# Patient Record
Sex: MALE | Race: WHITE | NOT HISPANIC OR LATINO | Employment: PART TIME | URBAN - METROPOLITAN AREA
[De-identification: names, ages, dates, MRNs, and addresses within clinical notes are randomized per-mention and may not be internally consistent; named-entity substitution may affect disease eponyms.]

---

## 2024-06-16 ENCOUNTER — HOSPITAL ENCOUNTER (EMERGENCY)
Facility: HOSPITAL | Age: 74
Discharge: HOME | End: 2024-06-16
Attending: EMERGENCY MEDICINE
Payer: MEDICARE

## 2024-06-16 VITALS
TEMPERATURE: 98.4 F | RESPIRATION RATE: 18 BRPM | OXYGEN SATURATION: 97 % | DIASTOLIC BLOOD PRESSURE: 116 MMHG | HEART RATE: 68 BPM | WEIGHT: 185 LBS | HEIGHT: 66 IN | BODY MASS INDEX: 29.73 KG/M2 | SYSTOLIC BLOOD PRESSURE: 188 MMHG

## 2024-06-16 DIAGNOSIS — I15.9 SECONDARY HYPERTENSION: Primary | ICD-10-CM

## 2024-06-16 LAB
ALBUMIN SERPL BCP-MCNC: 4.2 G/DL (ref 3.4–5)
ALP SERPL-CCNC: 63 U/L (ref 33–136)
ALT SERPL W P-5'-P-CCNC: 19 U/L (ref 10–52)
ANION GAP SERPL CALC-SCNC: 13 MMOL/L (ref 10–20)
AST SERPL W P-5'-P-CCNC: 17 U/L (ref 9–39)
BASOPHILS # BLD AUTO: 0.04 X10*3/UL (ref 0–0.1)
BASOPHILS NFR BLD AUTO: 0.6 %
BILIRUB SERPL-MCNC: 0.3 MG/DL (ref 0–1.2)
BUN SERPL-MCNC: 16 MG/DL (ref 6–23)
CALCIUM SERPL-MCNC: 9.7 MG/DL (ref 8.6–10.3)
CHLORIDE SERPL-SCNC: 102 MMOL/L (ref 98–107)
CO2 SERPL-SCNC: 30 MMOL/L (ref 21–32)
CREAT SERPL-MCNC: 0.8 MG/DL (ref 0.5–1.3)
EGFRCR SERPLBLD CKD-EPI 2021: >90 ML/MIN/1.73M*2
EOSINOPHIL # BLD AUTO: 0.08 X10*3/UL (ref 0–0.4)
EOSINOPHIL NFR BLD AUTO: 1.2 %
ERYTHROCYTE [DISTWIDTH] IN BLOOD BY AUTOMATED COUNT: 12.3 % (ref 11.5–14.5)
GLUCOSE SERPL-MCNC: 111 MG/DL (ref 74–99)
HCT VFR BLD AUTO: 39.7 % (ref 41–52)
HGB BLD-MCNC: 13.2 G/DL (ref 13.5–17.5)
IMM GRANULOCYTES # BLD AUTO: 0.03 X10*3/UL (ref 0–0.5)
IMM GRANULOCYTES NFR BLD AUTO: 0.5 % (ref 0–0.9)
LYMPHOCYTES # BLD AUTO: 1.26 X10*3/UL (ref 0.8–3)
LYMPHOCYTES NFR BLD AUTO: 19.1 %
MCH RBC QN AUTO: 29.7 PG (ref 26–34)
MCHC RBC AUTO-ENTMCNC: 33.2 G/DL (ref 32–36)
MCV RBC AUTO: 89 FL (ref 80–100)
MONOCYTES # BLD AUTO: 0.63 X10*3/UL (ref 0.05–0.8)
MONOCYTES NFR BLD AUTO: 9.6 %
NEUTROPHILS # BLD AUTO: 4.55 X10*3/UL (ref 1.6–5.5)
NEUTROPHILS NFR BLD AUTO: 69 %
NRBC BLD-RTO: 0 /100 WBCS (ref 0–0)
PLATELET # BLD AUTO: 177 X10*3/UL (ref 150–450)
POTASSIUM SERPL-SCNC: 4.1 MMOL/L (ref 3.5–5.3)
PROT SERPL-MCNC: 6.9 G/DL (ref 6.4–8.2)
RBC # BLD AUTO: 4.45 X10*6/UL (ref 4.5–5.9)
SODIUM SERPL-SCNC: 141 MMOL/L (ref 136–145)
WBC # BLD AUTO: 6.6 X10*3/UL (ref 4.4–11.3)

## 2024-06-16 PROCEDURE — 2500000001 HC RX 250 WO HCPCS SELF ADMINISTERED DRUGS (ALT 637 FOR MEDICARE OP): Performed by: EMERGENCY MEDICINE

## 2024-06-16 PROCEDURE — 99283 EMERGENCY DEPT VISIT LOW MDM: CPT

## 2024-06-16 PROCEDURE — 36415 COLL VENOUS BLD VENIPUNCTURE: CPT | Performed by: EMERGENCY MEDICINE

## 2024-06-16 PROCEDURE — 80053 COMPREHEN METABOLIC PANEL: CPT | Performed by: EMERGENCY MEDICINE

## 2024-06-16 PROCEDURE — 85025 COMPLETE CBC W/AUTO DIFF WBC: CPT | Performed by: EMERGENCY MEDICINE

## 2024-06-16 RX ORDER — AMLODIPINE BESYLATE 5 MG/1
5 TABLET ORAL ONCE
Status: COMPLETED | OUTPATIENT
Start: 2024-06-16 | End: 2024-06-16

## 2024-06-16 RX ORDER — AMLODIPINE BESYLATE 5 MG/1
5 TABLET ORAL DAILY
Qty: 30 TABLET | Refills: 0 | Status: SHIPPED | OUTPATIENT
Start: 2024-06-16 | End: 2024-07-16

## 2024-06-16 ASSESSMENT — COLUMBIA-SUICIDE SEVERITY RATING SCALE - C-SSRS
6. HAVE YOU EVER DONE ANYTHING, STARTED TO DO ANYTHING, OR PREPARED TO DO ANYTHING TO END YOUR LIFE?: NO
2. HAVE YOU ACTUALLY HAD ANY THOUGHTS OF KILLING YOURSELF?: NO
1. IN THE PAST MONTH, HAVE YOU WISHED YOU WERE DEAD OR WISHED YOU COULD GO TO SLEEP AND NOT WAKE UP?: NO

## 2024-06-16 ASSESSMENT — LIFESTYLE VARIABLES
HAVE PEOPLE ANNOYED YOU BY CRITICIZING YOUR DRINKING: NO
TOTAL SCORE: 0
EVER HAD A DRINK FIRST THING IN THE MORNING TO STEADY YOUR NERVES TO GET RID OF A HANGOVER: NO
HAVE YOU EVER FELT YOU SHOULD CUT DOWN ON YOUR DRINKING: NO
EVER FELT BAD OR GUILTY ABOUT YOUR DRINKING: NO

## 2024-06-16 ASSESSMENT — PAIN - FUNCTIONAL ASSESSMENT: PAIN_FUNCTIONAL_ASSESSMENT: 0-10

## 2024-06-16 ASSESSMENT — PAIN SCALES - GENERAL
PAINLEVEL_OUTOF10: 0 - NO PAIN
PAINLEVEL_OUTOF10: 0 - NO PAIN

## 2024-06-16 NOTE — ED TRIAGE NOTES
Pt was at [employer] today and they tried to take his blood pressure and the machine was unable to read it.Pt is not on B/P meds. No c/o headache, chest pain, dizziness or SOB.

## 2024-06-16 NOTE — ED PROVIDER NOTES
HPI   Chief Complaint   Patient presents with    Hypertension      Pt was at [employer] today and they tried to take his blood pressure and the machine was unable to read it.Pt is not on B/P meds. No c/o headache, chest pain, dizziness or SOB.       73-year-old male presents to the ED for elevated blood pressure.  He has blood pressure taken by somebody at [employer] was very high.  He has not seen a physician for a decade.  Does not take any medications.  He has been trying to control his blood pressure with diet changes.  He denies headache.  No vision changes.  No chest pain.  No numbness or tingling of extremities.                          Britni Coma Scale Score: 15                     Patient History   History reviewed. No pertinent past medical history.  History reviewed. No pertinent surgical history.  No family history on file.  Social History     Tobacco Use    Smoking status: Former     Types: Cigarettes    Smokeless tobacco: Never   Vaping Use    Vaping status: Never Used   Substance Use Topics    Alcohol use: Never    Drug use: Never       Physical Exam   ED Triage Vitals [06/16/24 1330]   Temperature Heart Rate Respirations BP   36.9 °C (98.4 °F) 67 18 (!) 233/102      Pulse Ox Temp Source Heart Rate Source Patient Position   97 % Temporal -- --      BP Location FiO2 (%)     -- --       Physical Exam  Vitals and nursing note reviewed.   Constitutional:       Appearance: Normal appearance.   HENT:      Head: Normocephalic and atraumatic.   Eyes:      Extraocular Movements: Extraocular movements intact.      Conjunctiva/sclera: Conjunctivae normal.      Pupils: Pupils are equal, round, and reactive to light.   Cardiovascular:      Rate and Rhythm: Normal rate and regular rhythm.   Pulmonary:      Effort: Pulmonary effort is normal.      Breath sounds: Normal breath sounds.   Abdominal:      General: Abdomen is flat.      Palpations: Abdomen is soft.   Musculoskeletal:         General: Normal range of motion.       Cervical back: Normal range of motion and neck supple.   Skin:     General: Skin is warm and dry.   Neurological:      General: No focal deficit present.      Mental Status: He is alert and oriented to person, place, and time.   Psychiatric:         Mood and Affect: Mood normal.         Behavior: Behavior normal.         Thought Content: Thought content normal.         Judgment: Judgment normal.         ED Course & MDM   Diagnoses as of 06/18/24 6665   Secondary hypertension       Medical Decision Making  73-year-old male presents ED for elevated blood pressure.  He is asymptomatic.  It improved spontaneously in the ED.  Obtain screening laboratory work which showed normal kidney function.  I have started him on amlodipine daily.  I have given him a follow-up number to call to be seen by PCP.  All questions were answered.        Procedure  Procedures     Terence Chapman MD  06/18/24 7344

## 2024-09-01 ENCOUNTER — APPOINTMENT (OUTPATIENT)
Dept: CARDIOLOGY | Facility: HOSPITAL | Age: 74
End: 2024-09-01
Payer: MEDICARE

## 2024-09-01 ENCOUNTER — HOSPITAL ENCOUNTER (EMERGENCY)
Facility: HOSPITAL | Age: 74
Discharge: HOME | End: 2024-09-01
Attending: STUDENT IN AN ORGANIZED HEALTH CARE EDUCATION/TRAINING PROGRAM
Payer: MEDICARE

## 2024-09-01 VITALS
TEMPERATURE: 97.5 F | BODY MASS INDEX: 28.79 KG/M2 | RESPIRATION RATE: 18 BRPM | SYSTOLIC BLOOD PRESSURE: 153 MMHG | HEART RATE: 68 BPM | OXYGEN SATURATION: 97 % | DIASTOLIC BLOOD PRESSURE: 75 MMHG | HEIGHT: 68 IN | WEIGHT: 190 LBS

## 2024-09-01 DIAGNOSIS — I10 PRIMARY HYPERTENSION: Primary | ICD-10-CM

## 2024-09-01 DIAGNOSIS — I15.9 SECONDARY HYPERTENSION: ICD-10-CM

## 2024-09-01 LAB
ALBUMIN SERPL BCP-MCNC: 4.3 G/DL (ref 3.4–5)
ALP SERPL-CCNC: 70 U/L (ref 33–136)
ALT SERPL W P-5'-P-CCNC: 21 U/L (ref 10–52)
ANION GAP SERPL CALC-SCNC: 15 MMOL/L (ref 10–20)
AST SERPL W P-5'-P-CCNC: 20 U/L (ref 9–39)
BASOPHILS # BLD AUTO: 0.03 X10*3/UL (ref 0–0.1)
BASOPHILS NFR BLD AUTO: 0.3 %
BILIRUB SERPL-MCNC: 0.6 MG/DL (ref 0–1.2)
BUN SERPL-MCNC: 13 MG/DL (ref 6–23)
CALCIUM SERPL-MCNC: 9.2 MG/DL (ref 8.6–10.3)
CARDIAC TROPONIN I PNL SERPL HS: 10 NG/L (ref 0–20)
CARDIAC TROPONIN I PNL SERPL HS: 15 NG/L (ref 0–20)
CHLORIDE SERPL-SCNC: 101 MMOL/L (ref 98–107)
CO2 SERPL-SCNC: 29 MMOL/L (ref 21–32)
CREAT SERPL-MCNC: 0.89 MG/DL (ref 0.5–1.3)
EGFRCR SERPLBLD CKD-EPI 2021: >90 ML/MIN/1.73M*2
EOSINOPHIL # BLD AUTO: 0.01 X10*3/UL (ref 0–0.4)
EOSINOPHIL NFR BLD AUTO: 0.1 %
ERYTHROCYTE [DISTWIDTH] IN BLOOD BY AUTOMATED COUNT: 12.3 % (ref 11.5–14.5)
GLUCOSE SERPL-MCNC: 109 MG/DL (ref 74–99)
HCT VFR BLD AUTO: 40.8 % (ref 41–52)
HGB BLD-MCNC: 14 G/DL (ref 13.5–17.5)
IMM GRANULOCYTES # BLD AUTO: 0.04 X10*3/UL (ref 0–0.5)
IMM GRANULOCYTES NFR BLD AUTO: 0.4 % (ref 0–0.9)
LYMPHOCYTES # BLD AUTO: 0.77 X10*3/UL (ref 0.8–3)
LYMPHOCYTES NFR BLD AUTO: 8.4 %
MAGNESIUM SERPL-MCNC: 2.15 MG/DL (ref 1.6–2.4)
MCH RBC QN AUTO: 30.2 PG (ref 26–34)
MCHC RBC AUTO-ENTMCNC: 34.3 G/DL (ref 32–36)
MCV RBC AUTO: 88 FL (ref 80–100)
MONOCYTES # BLD AUTO: 0.96 X10*3/UL (ref 0.05–0.8)
MONOCYTES NFR BLD AUTO: 10.4 %
NEUTROPHILS # BLD AUTO: 7.39 X10*3/UL (ref 1.6–5.5)
NEUTROPHILS NFR BLD AUTO: 80.4 %
NRBC BLD-RTO: 0 /100 WBCS (ref 0–0)
PLATELET # BLD AUTO: 168 X10*3/UL (ref 150–450)
POTASSIUM SERPL-SCNC: 3.5 MMOL/L (ref 3.5–5.3)
PROT SERPL-MCNC: 7 G/DL (ref 6.4–8.2)
RBC # BLD AUTO: 4.63 X10*6/UL (ref 4.5–5.9)
SODIUM SERPL-SCNC: 141 MMOL/L (ref 136–145)
WBC # BLD AUTO: 9.2 X10*3/UL (ref 4.4–11.3)

## 2024-09-01 PROCEDURE — 99283 EMERGENCY DEPT VISIT LOW MDM: CPT

## 2024-09-01 PROCEDURE — 93005 ELECTROCARDIOGRAM TRACING: CPT

## 2024-09-01 PROCEDURE — 83735 ASSAY OF MAGNESIUM: CPT

## 2024-09-01 PROCEDURE — 2500000001 HC RX 250 WO HCPCS SELF ADMINISTERED DRUGS (ALT 637 FOR MEDICARE OP)

## 2024-09-01 PROCEDURE — 84484 ASSAY OF TROPONIN QUANT: CPT

## 2024-09-01 PROCEDURE — 36415 COLL VENOUS BLD VENIPUNCTURE: CPT

## 2024-09-01 PROCEDURE — 85025 COMPLETE CBC W/AUTO DIFF WBC: CPT

## 2024-09-01 PROCEDURE — 80053 COMPREHEN METABOLIC PANEL: CPT

## 2024-09-01 RX ORDER — HYDRALAZINE HYDROCHLORIDE 20 MG/ML
5 INJECTION INTRAMUSCULAR; INTRAVENOUS ONCE
Status: DISCONTINUED | OUTPATIENT
Start: 2024-09-01 | End: 2024-09-01

## 2024-09-01 RX ORDER — AMLODIPINE BESYLATE 5 MG/1
5 TABLET ORAL DAILY
Qty: 30 TABLET | Refills: 0 | Status: SHIPPED | OUTPATIENT
Start: 2024-09-01 | End: 2024-10-01

## 2024-09-01 RX ORDER — LABETALOL 100 MG/1
200 TABLET, FILM COATED ORAL ONCE
Status: COMPLETED | OUTPATIENT
Start: 2024-09-01 | End: 2024-09-01

## 2024-09-01 ASSESSMENT — LIFESTYLE VARIABLES
EVER HAD A DRINK FIRST THING IN THE MORNING TO STEADY YOUR NERVES TO GET RID OF A HANGOVER: NO
TOTAL SCORE: 0
EVER FELT BAD OR GUILTY ABOUT YOUR DRINKING: NO
HAVE PEOPLE ANNOYED YOU BY CRITICIZING YOUR DRINKING: NO
HAVE YOU EVER FELT YOU SHOULD CUT DOWN ON YOUR DRINKING: NO

## 2024-09-01 ASSESSMENT — PAIN - FUNCTIONAL ASSESSMENT: PAIN_FUNCTIONAL_ASSESSMENT: 0-10

## 2024-09-01 ASSESSMENT — PAIN SCALES - GENERAL: PAINLEVEL_OUTOF10: 0 - NO PAIN

## 2024-09-01 NOTE — ED PROVIDER NOTES
History of Present Illness     History provided by: Patient  Limitations to History: None  External Records Reviewed with Brief Summary:  Prior ED note from      HPI:  Adrián Resendiz is a 73 y.o. male who presents for evaluation of hypertension with mildly.  Patient states that he has been checking his blood pressures at home and they have been elevated in the 200s over 100s.  Previously been on amlodipine which had given good blood pressure control.  He does not currently have a PCP and with him having headache and elevated blood pressure recommend for evaluation today.  He is not having any chest pain shortness of breath swelling abdominal pain numbness tingling weakness.  No falls or syncope's.  He has no other known past medical history.    Physical Exam   Triage vitals:  T 36.4 °C (97.5 °F)  HR 95  BP (!) 213/115  RR 16  O2 96 % None (Room air)    General: Awake, alert, in no acute distress  Eyes: Gaze conjugate.  No scleral icterus or injection  HENT: Normo-cephalic, atraumatic. No stridor  CV: Regular rate, regular rhythm. Radial pulses 2+ bilaterally  Resp: Breathing non-labored, speaking in full sentences.  Clear to auscultation bilaterally  GI: Soft, non-distended, non-tender. No rebound or guarding.  : Deferred  MSK/Extremities: No gross bony deformities. Moving all extremities  Skin: Warm. Appropriate color  Neuro: Alert. Oriented. Face symmetric. Speech is fluent.  Gross strength and sensation intact in b/l UE and LEs  Psych: Appropriate mood and affect      Medical Decision Making & ED Course   Medical Decision Makin y.o. male presents for symptomatic hyper tension with blood pressure in the 200s over 100s.  He has mild headache that improved with labetalol blood pressure control.  On review blood pressure is 133/69 and upon discharge 153/75.  Patient had previously been well-controlled on amlodipine and will give prescription for 1 month and PCP referral.  Patient did have a left  fascicular block on initial EKG that had resolved on repeat with blood pressure control.  He has downtrending troponins and otherwise unremarkable lab work.  Patient to be referred to primary care and was given strict return precautions for chest pain headache vision changes and syncope.  Patient is agreeable and all questions answered.  ----      Social Determinants of Health which Significantly Impact Care: Difficulty obtaining outpatient follow-up The following actions were taken to address these social determinants: Patient given list of PCPs    EKG Independent Interpretation: EKG interpreted by myself. Please see ED Course and MDM for full interpretation.    Independent Result Review and Interpretation: Results were independently reviewed and interpreted by myself. Please see ED course and MDM for full interpretation.    Chronic conditions affecting the patient's care: As documented in the MDM    The patient was discussed with the following consultants/services: None    Care Considerations: As per [facility]    ED Course:  ED Course as of 09/01/24 2211   Sun Sep 01, 2024   1750 EKG from 1522 with sinus rhythm heart rate of 82 bpm VA interval 144 MS  MS QTc 436 MS within normal limits patient has a bifascicular block with no previous EKG for comparison.  Will obtain delta troponin and EKG [SC]   1751 ECG 12 lead  EKG from 1727 with sinus rhythm heart rate of 67 VA interval 136 MS QRS 96 MS QTc 403 MS within normal limits no acute ST segment elevations, bifascicular block previously seen now resolved the patient does have T wave inversions in lateral leads.  Patient remains asymptomatic with no chest pain. [SC]   1751 CBC and Auto Differential(!)  No leukocytosis anemia thrombocytopenia [SC]   1751 Comprehensive Metabolic Panel(!)  No renal hepatic or electrolyte abnormalities [SC]   1751 Troponin I Series, High Sensitivity (0, 1 HR)  Initial troponin of 15 with delta troponin of 10 [SC]      ED Course User  Index  [SC] Judi Dillard DO         Diagnoses as of 09/01/24 2211   Primary hypertension     Disposition   As a result of the work-up, the patient was discharged home.  he was informed of his diagnosis and instructed to come back with any concerns or worsening of condition.  he and was agreeable to the plan as discussed above.  he was given the opportunity to ask questions.  All of the patient's questions were answered.    Procedures   Procedures    Patient seen and discussed with ED attending physician.    Judi Dillard DO  Emergency Medicine    The patient was seen by the resident/fellow.  I have personally performed a substantive portion of the encounter.  I have seen and examined the patient; agree with the workup, evaluation, MDM, management and diagnosis.  The care plan has been discussed with the resident; I have reviewed the resident’s note and agree with the documented findings.       Patient presenting with hypertension, mild headache, no focal neurologic deficits.  No symptoms suggestive of hypertensive emergency.  Blood work obtained, unremarkable including negative serial troponins.  His initial EKG did demonstrate a bifascicular block with no priors for comparison.  We repeated this which demonstrated resolution of this block after treatment his blood pressure.  I do not suspect any form of ischemia as he has no symptoms suggestive of this, instead suspecting demand related conduction changes.  Discussed the need to follow-up with primary care regarding his blood pressure as well as EKG changes.  Patient voices understanding.  Will give him referral as well as PCP list given prescription for amlodipine.  Patient discharged in stable condition.    I independently interpreted the patient's EKG and agree with the above mentioned interpretation.       MD Suleiman Richardson MD  09/02/24 1703

## 2024-09-05 LAB
ATRIAL RATE: 67 BPM
ATRIAL RATE: 82 BPM
P AXIS: 26 DEGREES
P AXIS: 53 DEGREES
P OFFSET: 214 MS
P OFFSET: 215 MS
P ONSET: 155 MS
P ONSET: 158 MS
PR INTERVAL: 136 MS
PR INTERVAL: 144 MS
Q ONSET: 226 MS
Q ONSET: 227 MS
QRS COUNT: 11 BEATS
QRS COUNT: 14 BEATS
QRS DURATION: 128 MS
QRS DURATION: 96 MS
QT INTERVAL: 374 MS
QT INTERVAL: 382 MS
QTC CALCULATION(BAZETT): 403 MS
QTC CALCULATION(BAZETT): 436 MS
QTC FREDERICIA: 396 MS
QTC FREDERICIA: 415 MS
R AXIS: -23 DEGREES
R AXIS: -53 DEGREES
T AXIS: -45 DEGREES
T AXIS: 35 DEGREES
T OFFSET: 414 MS
T OFFSET: 417 MS
VENTRICULAR RATE: 67 BPM
VENTRICULAR RATE: 82 BPM

## 2024-10-01 ENCOUNTER — OFFICE VISIT (OUTPATIENT)
Dept: PRIMARY CARE | Facility: CLINIC | Age: 74
End: 2024-10-01
Payer: MEDICARE

## 2024-10-01 VITALS
OXYGEN SATURATION: 99 % | DIASTOLIC BLOOD PRESSURE: 90 MMHG | WEIGHT: 188 LBS | HEIGHT: 68 IN | SYSTOLIC BLOOD PRESSURE: 164 MMHG | TEMPERATURE: 98.6 F | HEART RATE: 78 BPM | BODY MASS INDEX: 28.49 KG/M2

## 2024-10-01 DIAGNOSIS — Z12.5 PROSTATE CANCER SCREENING: ICD-10-CM

## 2024-10-01 DIAGNOSIS — I10 PRIMARY HYPERTENSION: Primary | ICD-10-CM

## 2024-10-01 PROCEDURE — 3077F SYST BP >= 140 MM HG: CPT | Performed by: FAMILY MEDICINE

## 2024-10-01 PROCEDURE — 99214 OFFICE O/P EST MOD 30 MIN: CPT | Performed by: FAMILY MEDICINE

## 2024-10-01 PROCEDURE — 1159F MED LIST DOCD IN RCRD: CPT | Performed by: FAMILY MEDICINE

## 2024-10-01 PROCEDURE — 1126F AMNT PAIN NOTED NONE PRSNT: CPT | Performed by: FAMILY MEDICINE

## 2024-10-01 PROCEDURE — 3008F BODY MASS INDEX DOCD: CPT | Performed by: FAMILY MEDICINE

## 2024-10-01 PROCEDURE — 1124F ACP DISCUSS-NO DSCNMKR DOCD: CPT | Performed by: FAMILY MEDICINE

## 2024-10-01 PROCEDURE — 3080F DIAST BP >= 90 MM HG: CPT | Performed by: FAMILY MEDICINE

## 2024-10-01 PROCEDURE — 1036F TOBACCO NON-USER: CPT | Performed by: FAMILY MEDICINE

## 2024-10-01 PROCEDURE — 99204 OFFICE O/P NEW MOD 45 MIN: CPT | Performed by: FAMILY MEDICINE

## 2024-10-01 RX ORDER — BENAZEPRIL HYDROCHLORIDE 20 MG/1
20 TABLET ORAL DAILY
Qty: 90 TABLET | Refills: 0 | Status: SHIPPED | OUTPATIENT
Start: 2024-10-01 | End: 2024-12-30

## 2024-10-01 RX ORDER — AMLODIPINE BESYLATE 5 MG/1
5 TABLET ORAL DAILY
Qty: 90 TABLET | Refills: 0 | Status: SHIPPED | OUTPATIENT
Start: 2024-10-01 | End: 2024-12-30

## 2024-10-01 ASSESSMENT — PATIENT HEALTH QUESTIONNAIRE - PHQ9
SUM OF ALL RESPONSES TO PHQ9 QUESTIONS 1 AND 2: 0
2. FEELING DOWN, DEPRESSED OR HOPELESS: NOT AT ALL
1. LITTLE INTEREST OR PLEASURE IN DOING THINGS: NOT AT ALL

## 2024-10-01 ASSESSMENT — PAIN SCALES - GENERAL: PAINLEVEL: 0-NO PAIN

## 2024-10-01 NOTE — PATIENT INSTRUCTIONS
Problem List Items Addressed This Visit             ICD-10-CM    Primary hypertension - Primary I10     - Emergency department documentation reviewed and discussed  - Blood pressure remains elevated in office today to which we will continue with amlodipine in combination with benazepril 20 mg daily  -Please monitor blood pressures at home with plans to contact office in approximately 2 weeks to give us an update on your readings.  If there are any symptomatic concerns in interval, please do not hesitate to reach out  -If medication is well-tolerated, can eventually convert to combination pill  -Continue to focus on healthy, low-fat diet with moderation of salt/caffeine/alcohol and regular physical activity as tolerated         Relevant Medications    amLODIPine (Norvasc) 5 mg tablet    benazepril (Lotensin) 20 mg tablet    Other Relevant Orders    TSH with reflex to Free T4 if abnormal    Lipid Panel    Comprehensive Metabolic Panel     Other Visit Diagnoses         Codes    Prostate cancer screening     Z12.5    Relevant Orders    Prostate Spec.Ag,Screen            Counseling:       Medication education:         Education:  The patient is counseled regarding potential side-effects of all new medications        Understanding:  Patient expressed understanding        Adherence:  No barriers to adherence identified    ** Please excuse any errors in grammar or translation related to this dictation. Voice recognition software was utilized to prepare this document. **

## 2024-10-01 NOTE — ASSESSMENT & PLAN NOTE
- Emergency department documentation reviewed and discussed  - Blood pressure remains elevated in office today to which we will continue with amlodipine in combination with benazepril 20 mg daily  -Please monitor blood pressures at home with plans to contact office in approximately 2 weeks to give us an update on your readings.  If there are any symptomatic concerns in interval, please do not hesitate to reach out  -If medication is well-tolerated, can eventually convert to combination pill  -Continue to focus on healthy, low-fat diet with moderation of salt/caffeine/alcohol and regular physical activity as tolerated

## 2024-10-01 NOTE — PROGRESS NOTES
Outpatient Visit Note    Chief Complaint   Patient presents with    New Patient Visit    ER Follow-up     BP f/u         HPI:  Adrián Resendiz is a 73 y.o. male who presents to the office as a new patient for ER follow-up.    Review of chart notes the patient presented to the [facility] emergency department initially in June 2024 stating that his blood pressure was elevated when someone attempted to take it at [employer].  Stated that he had not seen a physician for a decade.  Was not actively taking any medication.  Had been trying to control his blood pressure issues with dietary modification.  Denied any headaches, vision changes, chest pain, numbness tingling or shortness of breath.  While in the emergency department physical exam was generally reassuring with significantly elevated blood pressure.  Pressure started at 233/102 with repeat check at 188/116.  He was ultimately started on amlodipine 5 mg daily and discharged with plans for outpatient PCP follow-up.    Chart notes the patient presented to the [facility] clinic on 9/1/2024 secondary to blood pressure concerns.  Noted to have not had follow-up outpatient and had weaned himself off of amlodipine, stating that he did not want to take medication.  He reported a headache for the past 3 to 4 days which she rated at a 7/10.  Noted discomfort to be pressure throughout his entire head vision changes, nausea/vomiting, weakness balance issues.  Had been taking aspirin for headache without relief.  Denied any consciousness.  Blood pressure is otherwise unremarkable physical exam.  With elevated blood pressures and symptomatic complaints, he was directed to the emergency department for further evaluation.  Had consultation at [facility] per instructions.  Stated that he had been monitoring blood pressures at home having good control with amlodipine reiterated headache with no other systemic complaints.  Blood pressure in ER was 213/115.  Labetalol was  given with repeat blood pressure 133/69 and 153/75 at discharge.  He was given 1 month prescription for amlodipine.  Workup did include EKG which showed left fascicular block which resolved on repeat with controlled blood pressure.  He did have downtrending troponins and otherwise unremarkable CBC, CMP and magnesium level.  Outpatient follow-up was advocated.    Today he reports compliance with amlodipine regimen denying any adverse side effects.  Has been monitoring blood pressures at home with readings more consistently in the 140s-60s over 90s.  Denies any recurrent headaches, chest pain, lightheadedness or dizziness.  Does admit to prominent caffeine/coffee intake throughout the day.  Has been working on his diet, noting to have lost a considerable amount of weight through increased physical activity over the last 1 to 2 years though he has gained weight over the summer.    Current Medications  Current Outpatient Medications   Medication Instructions    amLODIPine (NORVASC) 5 mg, oral, Daily    benazepril (LOTENSIN) 20 mg, oral, Daily        Allergies  No Known Allergies     Past Medical History:   Diagnosis Date    Hypertension       Past Surgical History:   Procedure Laterality Date    TONSILLECTOMY       Family History   Problem Relation Name Age of Onset    Prostate cancer Brother      Prostate cancer Brother       Social History     Tobacco Use    Smoking status: Former     Types: Cigarettes    Smokeless tobacco: Never   Vaping Use    Vaping status: Never Used   Substance Use Topics    Alcohol use: Never    Drug use: Never       ROS  All pertinent positive symptoms are included in the history of present illness.  All other systems have been reviewed and are negative and noncontributory to this patient's current ailments.    VITAL SIGNS  Vitals:    10/01/24 1258   BP: 164/90   Pulse: 78   Temp: 37 °C (98.6 °F)   SpO2: 99%       PHYSICAL EXAM  GENERAL APPEARANCE: alert and oriented, Pleasant and cooperative,  No Acute Distress  HEENT: EOMI, PERRLA, MMM  HEART: RRR, normal S1S2, no murmurs, click or rubs  LUNGS: clear to auscultation bilaterally, no wheezes/rhonchi/rales  EXTREMITIES: no edema, normal ROM  SKIN: normal, no rash, unremarkable  NEUROLOGIC EXAM: non-focal exam  MUSCULOSKELETAL: no gross abnormalities  PSYCH: affect is normal, eye contact is good      Assessment/Plan   Problem List Items Addressed This Visit             ICD-10-CM    Primary hypertension - Primary I10     - Emergency department documentation reviewed and discussed  - Blood pressure remains elevated in office today to which we will continue with amlodipine in combination with benazepril 20 mg daily  -Please monitor blood pressures at home with plans to contact office in approximately 2 weeks to give us an update on your readings.  If there are any symptomatic concerns in interval, please do not hesitate to reach out  -If medication is well-tolerated, can eventually convert to combination pill  -Continue to focus on healthy, low-fat diet with moderation of salt/caffeine/alcohol and regular physical activity as tolerated         Relevant Medications    amLODIPine (Norvasc) 5 mg tablet    benazepril (Lotensin) 20 mg tablet    Other Relevant Orders    TSH with reflex to Free T4 if abnormal    Lipid Panel    Comprehensive Metabolic Panel     Other Visit Diagnoses         Codes    Prostate cancer screening     Z12.5    Relevant Orders    Prostate Spec.Ag,Screen            Counseling:       Medication education:         Education:  The patient is counseled regarding potential side-effects of all new medications        Understanding:  Patient expressed understanding        Adherence:  No barriers to adherence identified    ** Please excuse any errors in grammar or translation related to this dictation. Voice recognition software was utilized to prepare this document. **

## 2025-01-10 ENCOUNTER — APPOINTMENT (OUTPATIENT)
Dept: PRIMARY CARE | Facility: CLINIC | Age: 75
End: 2025-01-10
Payer: MEDICARE

## 2025-01-11 ENCOUNTER — LAB (OUTPATIENT)
Dept: LAB | Facility: LAB | Age: 75
End: 2025-01-11
Payer: MEDICARE

## 2025-01-11 DIAGNOSIS — I10 PRIMARY HYPERTENSION: ICD-10-CM

## 2025-01-11 DIAGNOSIS — Z12.5 PROSTATE CANCER SCREENING: ICD-10-CM

## 2025-01-11 LAB
ALBUMIN SERPL BCP-MCNC: 4.7 G/DL (ref 3.4–5)
ALP SERPL-CCNC: 75 U/L (ref 33–136)
ALT SERPL W P-5'-P-CCNC: 21 U/L (ref 10–52)
ANION GAP SERPL CALC-SCNC: 13 MMOL/L (ref 10–20)
AST SERPL W P-5'-P-CCNC: 21 U/L (ref 9–39)
BILIRUB SERPL-MCNC: 0.7 MG/DL (ref 0–1.2)
BUN SERPL-MCNC: 15 MG/DL (ref 6–23)
CALCIUM SERPL-MCNC: 9.5 MG/DL (ref 8.6–10.3)
CHLORIDE SERPL-SCNC: 99 MMOL/L (ref 98–107)
CHOLEST SERPL-MCNC: 228 MG/DL (ref 0–199)
CHOLESTEROL/HDL RATIO: 3.5
CO2 SERPL-SCNC: 31 MMOL/L (ref 21–32)
CREAT SERPL-MCNC: 0.91 MG/DL (ref 0.5–1.3)
EGFRCR SERPLBLD CKD-EPI 2021: 90 ML/MIN/1.73M*2
GLUCOSE SERPL-MCNC: 104 MG/DL (ref 74–99)
HDLC SERPL-MCNC: 64.6 MG/DL
LDLC SERPL CALC-MCNC: 142 MG/DL
NON HDL CHOLESTEROL: 163 MG/DL (ref 0–149)
POTASSIUM SERPL-SCNC: 4.2 MMOL/L (ref 3.5–5.3)
PROT SERPL-MCNC: 7 G/DL (ref 6.4–8.2)
PSA SERPL-MCNC: 3.98 NG/ML
SODIUM SERPL-SCNC: 139 MMOL/L (ref 136–145)
TRIGL SERPL-MCNC: 106 MG/DL (ref 0–149)
TSH SERPL-ACNC: 1.59 MIU/L (ref 0.44–3.98)
VLDL: 21 MG/DL (ref 0–40)

## 2025-01-11 PROCEDURE — 80061 LIPID PANEL: CPT

## 2025-01-11 PROCEDURE — G0103 PSA SCREENING: HCPCS

## 2025-01-11 PROCEDURE — 80053 COMPREHEN METABOLIC PANEL: CPT

## 2025-01-11 PROCEDURE — 84443 ASSAY THYROID STIM HORMONE: CPT

## 2025-01-14 ENCOUNTER — OFFICE VISIT (OUTPATIENT)
Dept: PRIMARY CARE | Facility: CLINIC | Age: 75
End: 2025-01-14
Payer: MEDICARE

## 2025-01-14 VITALS
OXYGEN SATURATION: 100 % | SYSTOLIC BLOOD PRESSURE: 130 MMHG | HEIGHT: 68 IN | DIASTOLIC BLOOD PRESSURE: 78 MMHG | HEART RATE: 77 BPM | TEMPERATURE: 97.8 F | BODY MASS INDEX: 28.49 KG/M2 | WEIGHT: 188 LBS

## 2025-01-14 DIAGNOSIS — E78.5 HYPERLIPIDEMIA, UNSPECIFIED HYPERLIPIDEMIA TYPE: ICD-10-CM

## 2025-01-14 DIAGNOSIS — R73.9 HYPERGLYCEMIA: ICD-10-CM

## 2025-01-14 DIAGNOSIS — I10 PRIMARY HYPERTENSION: Primary | ICD-10-CM

## 2025-01-14 PROCEDURE — 99214 OFFICE O/P EST MOD 30 MIN: CPT | Performed by: FAMILY MEDICINE

## 2025-01-14 PROCEDURE — 1159F MED LIST DOCD IN RCRD: CPT | Performed by: FAMILY MEDICINE

## 2025-01-14 PROCEDURE — 1160F RVW MEDS BY RX/DR IN RCRD: CPT | Performed by: FAMILY MEDICINE

## 2025-01-14 PROCEDURE — 1036F TOBACCO NON-USER: CPT | Performed by: FAMILY MEDICINE

## 2025-01-14 PROCEDURE — 3075F SYST BP GE 130 - 139MM HG: CPT | Performed by: FAMILY MEDICINE

## 2025-01-14 PROCEDURE — 3008F BODY MASS INDEX DOCD: CPT | Performed by: FAMILY MEDICINE

## 2025-01-14 PROCEDURE — 1126F AMNT PAIN NOTED NONE PRSNT: CPT | Performed by: FAMILY MEDICINE

## 2025-01-14 PROCEDURE — 3078F DIAST BP <80 MM HG: CPT | Performed by: FAMILY MEDICINE

## 2025-01-14 PROCEDURE — 1124F ACP DISCUSS-NO DSCNMKR DOCD: CPT | Performed by: FAMILY MEDICINE

## 2025-01-14 RX ORDER — AMLODIPINE BESYLATE 5 MG/1
5 TABLET ORAL DAILY
Qty: 90 TABLET | Refills: 3 | Status: SHIPPED | OUTPATIENT
Start: 2025-01-14 | End: 2026-01-14

## 2025-01-14 RX ORDER — BENAZEPRIL HYDROCHLORIDE 20 MG/1
20 TABLET ORAL DAILY
Qty: 90 TABLET | Refills: 3 | Status: SHIPPED | OUTPATIENT
Start: 2025-01-14 | End: 2026-01-14

## 2025-01-14 RX ORDER — AMLODIPINE AND BENAZEPRIL HYDROCHLORIDE 5; 20 MG/1; MG/1
1 CAPSULE ORAL DAILY
Qty: 100 CAPSULE | Refills: 3 | Status: CANCELLED | OUTPATIENT
Start: 2025-01-14 | End: 2026-02-18

## 2025-01-14 ASSESSMENT — PATIENT HEALTH QUESTIONNAIRE - PHQ9
2. FEELING DOWN, DEPRESSED OR HOPELESS: NOT AT ALL
SUM OF ALL RESPONSES TO PHQ9 QUESTIONS 1 AND 2: 0
1. LITTLE INTEREST OR PLEASURE IN DOING THINGS: NOT AT ALL

## 2025-01-14 ASSESSMENT — PAIN SCALES - GENERAL: PAINLEVEL_OUTOF10: 0-NO PAIN

## 2025-01-14 NOTE — ASSESSMENT & PLAN NOTE
- Blood pressure stable in office today   -Continue to focus on healthy, low-fat diet with moderation of salt/caffeine/alcohol and regular physical activity as tolerated

## 2025-01-14 NOTE — PROGRESS NOTES
Outpatient Visit Note    Chief Complaint   Patient presents with    Follow-up     HTN f/u         HPI:  Adrián Resendiz is a 74 y.o. male who presents to the office for blood pressure follow-up.  He was last seen in the office in October 2020 for as a new patient for ER follow-up.    Review of chart notes the patient presented to the [facility] emergency department initially in June 2024 stating that his blood pressure was elevated when someone attempted to take it at [employer].  Stated that he had not seen a physician for a decade.  Was not actively taking any medication.  Had been trying to control his blood pressure issues with dietary modification.  Denied any headaches, vision changes, chest pain, numbness tingling or shortness of breath.  While in the emergency department physical exam was generally reassuring with significantly elevated blood pressure.  Pressure started at 233/102 with repeat check at 188/116.  He was ultimately started on amlodipine 5 mg daily and discharged with plans for outpatient PCP follow-up.    Chart notes the patient presented to the [facility] clinic on 9/1/2024 secondary to blood pressure concerns.  Noted to have not had follow-up outpatient and had weaned himself off of amlodipine, stating that he did not want to take medication.  He reported a headache for the past 3 to 4 days which she rated at a 7/10.  Noted discomfort to be pressure throughout his entire head vision changes, nausea/vomiting, weakness balance issues.  Had been taking aspirin for headache without relief.  Denied any consciousness.  Blood pressure is otherwise unremarkable physical exam.  With elevated blood pressures and symptomatic complaints, he was directed to the emergency department for further evaluation.  Had consultation at [facility] per instructions.  Stated that he had been monitoring blood pressures at home having good control with amlodipine reiterated headache with no other systemic  complaints.  Blood pressure in ER was 213/115.  Labetalol was given with repeat blood pressure 133/69 and 153/75 at discharge.  He was given 1 month prescription for amlodipine.  Workup did include EKG which showed left fascicular block which resolved on repeat with controlled blood pressure.  He did have downtrending troponins and otherwise unremarkable CBC, CMP and magnesium level.  Outpatient follow-up was advocated.    At follow-up he reported compliance with amlodipine regimen denying any adverse side effects.  Had been monitoring blood pressures at home with readings more consistently in the 140s-60s over 90s.  Denied any recurrent headaches, chest pain, lightheadedness or dizziness.  Admitted to prominent caffeine/coffee intake throughout the day.  Had been working on his diet, noting to have lost a considerable amount of weight through increased physical activity over the last 1 to 2 years though he has gained weight over the summer.  Ultimately plan was set for focus on continued lifestyle efforts with benazepril 20 mg daily and amlodipine 5 mg daily.  Orders for routine blood work were given, which were recently completed on 1/11/2025 including CMP, lipid panel, TSH and PSA which was remarkable for mild hyperglycemia and hyperlipidemia.    He reports states to be tolerating medications well with no adverse side effects.  States he recently ran out of medication in which she is in need of refills.    ADV: None on file    Current Medications  Current Outpatient Medications   Medication Instructions    amLODIPine (NORVASC) 5 mg, oral, Daily    benazepril (LOTENSIN) 20 mg, oral, Daily        Allergies  No Known Allergies     Past Medical History:   Diagnosis Date    Hypertension       Past Surgical History:   Procedure Laterality Date    TONSILLECTOMY       Family History   Problem Relation Name Age of Onset    Prostate cancer Brother      Prostate cancer Brother       Social History     Tobacco Use    Smoking  status: Former     Types: Cigarettes    Smokeless tobacco: Never   Vaping Use    Vaping status: Never Used   Substance Use Topics    Alcohol use: Never    Drug use: Never       ROS  All pertinent positive symptoms are included in the history of present illness.  All other systems have been reviewed and are negative and noncontributory to this patient's current ailments.    VITAL SIGNS  Vitals:    01/14/25 0820   BP: 130/78   Pulse: 77   Temp: 36.6 °C (97.8 °F)   SpO2: 100%         PHYSICAL EXAM  GENERAL APPEARANCE: alert and oriented, Pleasant and cooperative, No Acute Distress  HEENT: EOMI, PERRLA, MMM  HEART: RRR, normal S1S2, no murmurs, click or rubs  LUNGS: clear to auscultation bilaterally, no wheezes/rhonchi/rales  EXTREMITIES: no edema, normal ROM  SKIN: normal, no rash, unremarkable  NEUROLOGIC EXAM: non-focal exam  MUSCULOSKELETAL: no gross abnormalities  PSYCH: affect is normal, eye contact is good      Assessment/Plan   Problem List Items Addressed This Visit             ICD-10-CM    Primary hypertension - Primary I10     - Blood pressure stable in office today   -Continue to focus on healthy, low-fat diet with moderation of salt/caffeine/alcohol and regular physical activity as tolerated         Relevant Medications    amLODIPine (Norvasc) 5 mg tablet    benazepril (Lotensin) 20 mg tablet    Other Relevant Orders    CT cardiac scoring wo IV contrast    Hyperlipidemia E78.5     - Recent cholesterol levels do show elevated LDL/bad cholesterol  -Trial of cholesterol-lowering medication would likely be encouraged to decrease overall cardiac risk score  -CT calcium scoring advocated         Relevant Orders    CT cardiac scoring wo IV contrast    Hyperglycemia R73.9     - Will continue to monitor blood sugars with routine blood work  -Encouraged to focus on healthy, low-fat diet with moderation of carbohydrates and regular physical activity as tolerated            Counseling:       Medication education:          Education:  The patient is counseled regarding potential side-effects of all new medications        Understanding:  Patient expressed understanding        Adherence:  No barriers to adherence identified    ** Please excuse any errors in grammar or translation related to this dictation. Voice recognition software was utilized to prepare this document. **

## 2025-01-14 NOTE — PATIENT INSTRUCTIONS
Problem List Items Addressed This Visit             ICD-10-CM    Primary hypertension - Primary I10     - Blood pressure stable in office today   -Continue to focus on healthy, low-fat diet with moderation of salt/caffeine/alcohol and regular physical activity as tolerated         Relevant Medications    amLODIPine (Norvasc) 5 mg tablet    benazepril (Lotensin) 20 mg tablet    Other Relevant Orders    CT cardiac scoring wo IV contrast    Hyperlipidemia E78.5     - Recent cholesterol levels do show elevated LDL/bad cholesterol  -Trial of cholesterol-lowering medication would likely be encouraged to decrease overall cardiac risk score  -CT calcium scoring advocated         Relevant Orders    CT cardiac scoring wo IV contrast    Hyperglycemia R73.9     - Will continue to monitor blood sugars with routine blood work  -Encouraged to focus on healthy, low-fat diet with moderation of carbohydrates and regular physical activity as tolerated            Counseling:       Medication education:         Education:  The patient is counseled regarding potential side-effects of all new medications        Understanding:  Patient expressed understanding        Adherence:  No barriers to adherence identified    ** Please excuse any errors in grammar or translation related to this dictation. Voice recognition software was utilized to prepare this document. **

## 2025-01-14 NOTE — ASSESSMENT & PLAN NOTE
- Will continue to monitor blood sugars with routine blood work  -Encouraged to focus on healthy, low-fat diet with moderation of carbohydrates and regular physical activity as tolerated

## 2025-01-14 NOTE — ASSESSMENT & PLAN NOTE
- Recent cholesterol levels do show elevated LDL/bad cholesterol  -Trial of cholesterol-lowering medication would likely be encouraged to decrease overall cardiac risk score  -CT calcium scoring advocated

## 2025-01-28 ENCOUNTER — HOSPITAL ENCOUNTER (OUTPATIENT)
Dept: RADIOLOGY | Facility: HOSPITAL | Age: 75
Discharge: HOME | End: 2025-01-28
Payer: MEDICARE

## 2025-01-28 DIAGNOSIS — I10 PRIMARY HYPERTENSION: ICD-10-CM

## 2025-01-28 DIAGNOSIS — E78.5 HYPERLIPIDEMIA, UNSPECIFIED HYPERLIPIDEMIA TYPE: ICD-10-CM

## 2025-01-28 PROCEDURE — 75571 CT HRT W/O DYE W/CA TEST: CPT

## 2025-02-11 DIAGNOSIS — E78.5 HYPERLIPIDEMIA, UNSPECIFIED HYPERLIPIDEMIA TYPE: Primary | ICD-10-CM

## 2025-02-11 RX ORDER — ATORVASTATIN CALCIUM 20 MG/1
20 TABLET, FILM COATED ORAL DAILY
Qty: 90 TABLET | Refills: 1 | Status: SHIPPED | OUTPATIENT
Start: 2025-02-11 | End: 2025-08-10

## 2025-02-24 ENCOUNTER — APPOINTMENT (OUTPATIENT)
Dept: PRIMARY CARE | Facility: CLINIC | Age: 75
End: 2025-02-24
Payer: MEDICARE

## 2025-02-26 ENCOUNTER — APPOINTMENT (OUTPATIENT)
Dept: PRIMARY CARE | Facility: CLINIC | Age: 75
End: 2025-02-26
Payer: MEDICARE

## 2025-03-05 ENCOUNTER — OFFICE VISIT (OUTPATIENT)
Dept: PRIMARY CARE | Facility: CLINIC | Age: 75
End: 2025-03-05
Payer: MEDICARE

## 2025-03-05 VITALS
WEIGHT: 187 LBS | OXYGEN SATURATION: 98 % | DIASTOLIC BLOOD PRESSURE: 82 MMHG | SYSTOLIC BLOOD PRESSURE: 120 MMHG | BODY MASS INDEX: 28.34 KG/M2 | TEMPERATURE: 98.4 F | HEART RATE: 68 BPM | HEIGHT: 68 IN

## 2025-03-05 DIAGNOSIS — I10 PRIMARY HYPERTENSION: Primary | ICD-10-CM

## 2025-03-05 DIAGNOSIS — I25.10 ATHEROSCLEROSIS OF NATIVE CORONARY ARTERY OF NATIVE HEART WITHOUT ANGINA PECTORIS: ICD-10-CM

## 2025-03-05 DIAGNOSIS — E78.5 HYPERLIPIDEMIA, UNSPECIFIED HYPERLIPIDEMIA TYPE: ICD-10-CM

## 2025-03-05 PROCEDURE — 1126F AMNT PAIN NOTED NONE PRSNT: CPT | Performed by: FAMILY MEDICINE

## 2025-03-05 PROCEDURE — 1158F ADVNC CARE PLAN TLK DOCD: CPT | Performed by: FAMILY MEDICINE

## 2025-03-05 PROCEDURE — 3074F SYST BP LT 130 MM HG: CPT | Performed by: FAMILY MEDICINE

## 2025-03-05 PROCEDURE — 99214 OFFICE O/P EST MOD 30 MIN: CPT | Performed by: FAMILY MEDICINE

## 2025-03-05 PROCEDURE — 3008F BODY MASS INDEX DOCD: CPT | Performed by: FAMILY MEDICINE

## 2025-03-05 PROCEDURE — 1159F MED LIST DOCD IN RCRD: CPT | Performed by: FAMILY MEDICINE

## 2025-03-05 PROCEDURE — 1036F TOBACCO NON-USER: CPT | Performed by: FAMILY MEDICINE

## 2025-03-05 PROCEDURE — 3079F DIAST BP 80-89 MM HG: CPT | Performed by: FAMILY MEDICINE

## 2025-03-05 PROCEDURE — 1123F ACP DISCUSS/DSCN MKR DOCD: CPT | Performed by: FAMILY MEDICINE

## 2025-03-05 PROCEDURE — 1160F RVW MEDS BY RX/DR IN RCRD: CPT | Performed by: FAMILY MEDICINE

## 2025-03-05 RX ORDER — ATORVASTATIN CALCIUM 20 MG/1
20 TABLET, FILM COATED ORAL DAILY
Qty: 90 TABLET | Refills: 1 | Status: SHIPPED | OUTPATIENT
Start: 2025-03-05 | End: 2025-09-01

## 2025-03-05 ASSESSMENT — PATIENT HEALTH QUESTIONNAIRE - PHQ9
1. LITTLE INTEREST OR PLEASURE IN DOING THINGS: NOT AT ALL
SUM OF ALL RESPONSES TO PHQ9 QUESTIONS 1 AND 2: 0
2. FEELING DOWN, DEPRESSED OR HOPELESS: NOT AT ALL

## 2025-03-05 ASSESSMENT — PAIN SCALES - GENERAL: PAINLEVEL_OUTOF10: 0-NO PAIN

## 2025-03-05 NOTE — ASSESSMENT & PLAN NOTE
- Moderate calcifications appreciated on coronary calcium scoring test to which we will trial atorvastatin regimen  -Please contact office if any adverse side effects or concerns in interval with plans for follow-up in 3 months to recheck cholesterol levels

## 2025-03-05 NOTE — PROGRESS NOTES
Outpatient Visit Note    Chief Complaint   Patient presents with    Follow-up     Discuss CT results         HPI:  Adrián Resendiz is a 74 y.o. male who presents to the office for follow-up.  He was last seen in the office in January for blood pressure follow up, having initially established in October 2024 as a new patient for ER follow-up.    Review of chart notes the patient presented to the [facility] emergency department initially in June 2024 stating that his blood pressure was elevated when someone attempted to take it at [facility].  Stated that he had not seen a physician for a decade.  Was not actively taking any medication.  Had been trying to control his blood pressure issues with dietary modification.  Denied any headaches, vision changes, chest pain, numbness tingling or shortness of breath.  While in the emergency department physical exam was generally reassuring with significantly elevated blood pressure.  Pressure started at 233/102 with repeat check at 188/116.  He was ultimately started on amlodipine 5 mg daily and discharged with plans for outpatient PCP follow-up.    Chart notes the patient presented to the [facility] clinic on 9/1/2024 secondary to blood pressure concerns.  Noted to have not had follow-up outpatient and had weaned himself off of amlodipine, stating that he did not want to take medication.  He reported a headache for the past 3 to 4 days which she rated at a 7/10.  Noted discomfort to be pressure throughout his entire head vision changes, nausea/vomiting, weakness balance issues.  Had been taking aspirin for headache without relief.  Denied any consciousness.  Blood pressure is otherwise unremarkable physical exam.  With elevated blood pressures and symptomatic complaints, he was directed to the emergency department for further evaluation.  Had consultation at [facility] per instructions.  Stated that he had been monitoring blood pressures at home having good control  with amlodipine reiterated headache with no other systemic complaints.  Blood pressure in ER was 213/115.  Labetalol was given with repeat blood pressure 133/69 and 153/75 at discharge.  He was given 1 month prescription for amlodipine.  Workup did include EKG which showed left fascicular block which resolved on repeat with controlled blood pressure.  He did have downtrending troponins and otherwise unremarkable CBC, CMP and magnesium level.  Outpatient follow-up was advocated.    At follow-up he reported compliance with amlodipine regimen denying any adverse side effects.  Had been monitoring blood pressures at home with readings more consistently in the 140s-60s over 90s.  Denied any recurrent headaches, chest pain, lightheadedness or dizziness.  Admitted to prominent caffeine/coffee intake throughout the day.  Had been working on his diet, noting to have lost a considerable amount of weight through increased physical activity over the last 1 to 2 years though he has gained weight over the summer.  Ultimately plan was set for focus on continued lifestyle efforts with benazepril 20 mg daily and amlodipine 5 mg daily.  Orders for routine blood work were given, which were completed on 1/11/2025 including CMP, lipid panel, TSH and PSA which was remarkable for mild hyperglycemia and hyperlipidemia.    He reports states to be tolerating medications well with no adverse side effects.  In interval, patient did complete calcium score screening to which he had a score of 401.  With information and ASCVD risk score, atorvastatin 20 mg was advocated.  At this time, he states they have not initiated atorvastatin regimen as he wanted to talk about his results.  No symptomatic complaints such as chest pain, shortness of breath, lightheadedness or dizziness.  Reiterates strong family history of cardiovascular disease.  At this time, he does feel willing to try cholesterol-lowering medication with ongoing focus on healthy  lifestyle.    ADV: None on file    Current Medications  Current Outpatient Medications   Medication Instructions    amLODIPine (NORVASC) 5 mg, oral, Daily    atorvastatin (LIPITOR) 20 mg, oral, Daily    benazepril (LOTENSIN) 20 mg, oral, Daily        Allergies  No Known Allergies     Past Medical History:   Diagnosis Date    Hypertension       Past Surgical History:   Procedure Laterality Date    TONSILLECTOMY       Family History   Problem Relation Name Age of Onset    Prostate cancer Brother      Prostate cancer Brother       Social History     Tobacco Use    Smoking status: Former     Types: Cigarettes    Smokeless tobacco: Never   Vaping Use    Vaping status: Never Used   Substance Use Topics    Alcohol use: Never    Drug use: Never       ROS  All pertinent positive symptoms are included in the history of present illness.  All other systems have been reviewed and are negative and noncontributory to this patient's current ailments.    VITAL SIGNS  Vitals:    03/05/25 0803   BP: 120/82   Pulse: 68   Temp: 36.9 °C (98.4 °F)   SpO2: 98%       PHYSICAL EXAM  GENERAL APPEARANCE: alert and oriented, Pleasant and cooperative, No Acute Distress  HEENT: EOMI, PERRLA, MMM  HEART: RRR, normal S1S2, no murmurs, click or rubs  LUNGS: clear to auscultation bilaterally, no wheezes/rhonchi/rales  EXTREMITIES: no edema, normal ROM  SKIN: normal, no rash, unremarkable  NEUROLOGIC EXAM: non-focal exam  MUSCULOSKELETAL: no gross abnormalities  PSYCH: affect is normal, eye contact is good      Assessment/Plan   Problem List Items Addressed This Visit             ICD-10-CM    Primary hypertension - Primary I10     - Blood pressure stable in office today   -Continue to focus on healthy, low-fat diet with moderation of salt/caffeine/alcohol and regular physical activity as tolerated         Hyperlipidemia E78.5     - Continue to focus on healthy, low-fat diet with regular physical activity         Relevant Medications    atorvastatin  (Lipitor) 20 mg tablet    Atherosclerosis of native coronary artery of native heart without angina pectoris I25.10     - Moderate calcifications appreciated on coronary calcium scoring test to which we will trial atorvastatin regimen  -Please contact office if any adverse side effects or concerns in interval with plans for follow-up in 3 months to recheck cholesterol levels                Counseling:       Medication education:         Education:  The patient is counseled regarding potential side-effects of all new medications        Understanding:  Patient expressed understanding        Adherence:  No barriers to adherence identified    ** Please excuse any errors in grammar or translation related to this dictation. Voice recognition software was utilized to prepare this document. **

## 2025-03-05 NOTE — PATIENT INSTRUCTIONS
Problem List Items Addressed This Visit             ICD-10-CM    Primary hypertension - Primary I10     - Blood pressure stable in office today   -Continue to focus on healthy, low-fat diet with moderation of salt/caffeine/alcohol and regular physical activity as tolerated         Hyperlipidemia E78.5     - Continue to focus on healthy, low-fat diet with regular physical activity         Relevant Medications    atorvastatin (Lipitor) 20 mg tablet    Atherosclerosis of native coronary artery of native heart without angina pectoris I25.10     - Moderate calcifications appreciated on coronary calcium scoring test to which we will trial atorvastatin regimen  -Please contact office if any adverse side effects or concerns in interval with plans for follow-up in 3 months to recheck cholesterol levels                Counseling:       Medication education:         Education:  The patient is counseled regarding potential side-effects of all new medications        Understanding:  Patient expressed understanding        Adherence:  No barriers to adherence identified    ** Please excuse any errors in grammar or translation related to this dictation. Voice recognition software was utilized to prepare this document. **